# Patient Record
Sex: MALE | Race: WHITE | NOT HISPANIC OR LATINO | Employment: OTHER | URBAN - METROPOLITAN AREA
[De-identification: names, ages, dates, MRNs, and addresses within clinical notes are randomized per-mention and may not be internally consistent; named-entity substitution may affect disease eponyms.]

---

## 2022-12-28 ENCOUNTER — REFERRAL (OUTPATIENT)
Dept: TRANSPLANT | Facility: CLINIC | Age: 62
End: 2022-12-28

## 2022-12-28 DIAGNOSIS — J44.9 COPD (CHRONIC OBSTRUCTIVE PULMONARY DISEASE) (H): Primary | ICD-10-CM

## 2022-12-28 NOTE — LETTER
Issa Lyle  [address] [place] [id number]                January 23, 2023      IMAGING REQUEST  Lung Transplant Team Request for Records from Referring Providers Office for Patients Referred to [facility] Lung Transplant Program                  Facility: [facility]      Fax# [phone]    Images Needed to Process Intake of Patient:     Chest xray - most recent)    CT  Chest - all within the last 24 months    CT Chest/Abdomen/Pelvis - most recent    US Abdomen- most recent    Echo - most recent    Coronary Angiogram - most recent   Requested imaging may be electronically sent to the [employer] imaging system via [employer] DICOM connection. When unable to send imaging electronically, an exported DICOM CD may be sent. Please indicate when images have been sent electronically on a return faxed cover sheet.    If  the requested records are not at your facility, please indicate this information on a return faxed cover sheet.      Please send all scans/slides to:   [facility]  [facility] [address]      Please fax requested paper records or fax cover letters to [phone] within 1-3 business days.                  For questions or concerns, please call our office at [phone].

## 2022-12-28 NOTE — LETTER
January 17, 2023    Issa Lyle  [address] [place] [id number]    Dear Issa,    Thank you for your interest in the Transplant Center at [facility]. We look forward to being a part of your care team and assisting you through the transplant process.    As we discussed, your transplant coordinator is Marla Chavez (Lung).  You may call your coordinator at any time with questions or concerns.  Your first scheduled call will be on January 19, 2023 between 10:00 am and 12:00 pm.  If this needs to change, call [phone].    Please complete the following.    1. Fill out and return the enclosed forms    Authorization for Electronic Communication    Authorization to Discuss Protected Health Information    Authorization for Release of Protected Health Information    2. Sign up for:    [employer], access to your electronic medical record (see enclosed pamphlet)    [employer], a transplant education website    You can use these tools to learn more about your transplant, communicate with your care team, and track your medical details    Sincerely,    Solid Organ Transplant  [facility]    cc: Referring Physician PCP

## 2022-12-29 ENCOUNTER — TRANSCRIBE ORDERS (OUTPATIENT)
Dept: OTHER | Age: 62
End: 2022-12-29

## 2022-12-29 DIAGNOSIS — J44.9 CHRONIC OBSTRUCTIVE PULMONARY DISEASE, UNSPECIFIED COPD TYPE (H): Primary | ICD-10-CM

## 2023-01-17 VITALS — BODY MASS INDEX: 24.11 KG/M2 | WEIGHT: 178 LBS | HEIGHT: 72 IN

## 2023-01-17 NOTE — TELEPHONE ENCOUNTER
SOT LUNG INTAKE STEPHENIE      Referring Provider: Dr. Cutler  Primary Provider: Meryl Sampson  Specialist: none  Diagnosis:COPD, lung ca-rt upper lobe  Source/Facility: [facility]    Smoking/nicotine use history: quit 04/2022  Alcohol use history: none  Drug use history: none  Cancer history:  lung ca-rt upper lobe  Cardiac history:  none  BMI:24.1    Notes: Due to COVID, verbal consent received for Care Everywhere Authorization from the patient during this call.    Is patient in a group home/assisted living? no  Does patient have a guardian? no    Referral intake process completed.  Patient is aware that after financial approval is received, medical records will be requested.   Patient confirmed for a callback from transplant coordinator on January 19, 2023. (within 2 weeks)  Tentative evaluation date TBD. (within 4 weeks)    Confirmed coordinator will discuss evaluation process in more detail at the time of their call.   Patient is aware of the need to arrange age appropriate cancer screening, vaccinations, and dental care.  Reminded patient to complete questionnaire, complete medical records release, and review packet prior to evaluation visit .  Assessed patient for special needs (ie--wheelchair, assistance, guardian, and ):  none   Patient instructed to call [phone] with questions.

## 2023-01-19 NOTE — TELEPHONE ENCOUNTER
Contacted patient to complete transplant coordinator intake and discuss lung transplant process. Patient was unavailable at time of call. Left voicemail with call back information for SOT Office [phone]. Awaiting call back.

## 2023-01-26 NOTE — TELEPHONE ENCOUNTER
Contacted patient to follow up on lung transplant referral. Patient reported he was unable to talk requested call back on Monday 1/30/2023.

## 2023-05-08 ENCOUNTER — TELEPHONE (OUTPATIENT)
Dept: TRANSPLANT | Facility: CLINIC | Age: 63
End: 2023-05-08
Payer: COMMERCIAL

## 2023-05-08 NOTE — LETTER
May 8, 2023    Issa Lyle  [address] [place] [address] [address] [place] [id number]      Dear Mr. Lyle,   The purpose of this letter is to verify that as we discussed by phone, you have thought about lung transplant and are not interested in moving forward with the lung transplant process, at this time, due to concerns with upcoming changes in your insurance. As discussed, when you have resolved your insurance concerns please reach out to the lung transplant program to request your referral be re-open.    When you are ready to move forward with the lung transplant process, you may schedule a visit with a transplant pulmonologist by calling [phone] and asking to speak to your transplant coordinator.  We recommend that you continue to follow up with your referring doctor and primary care doctor in order to manage your health concerns.  We want you to know that our program has physician and surgeon coverage 24 hours a day, 365 days a year. In addition, our transplant surgeons and physicians will not be on call for two or more transplant programs more than 30 miles apart unless the circumstances have been reviewed and approved by the [employer] for Organ Sharing (UNOS) Membership and Professional Standards Committee (MPSC). Finally, our primary physician and primary surgeons are not designated as the primary surgeon or primary physician at more than 1 transplant hospital. If this coverage changes or there are substantial program changes, you will be notified in writing by letter.   Attached is a letter from UNOS that describes the services and information offered to patients by UNOS and the Organ Procurement and Transplantation Network (OPTN).  Thank you for allowing us to participate in your care.  We wish you well.  Sincerely,    Solid Organ Transplant  [facility]    Enclosures: UNOS Letter      The Organ Procurement and Transplantation  Network  Toll-free patient services line:     Your resource for organ transplant information    If you have a question regarding your own medical care, you always should call your transplant hospital first. However, for general organ transplant-related information, you can call the Organ Procurement and Transplantation Network (OPTN) toll-free patient services line at [phone]- [phone]. Anyone, including potential transplant candidates, candidates, recipients, family members, friends, living donors, and donor family members, can call this number to:      Talk about organ donation, living donation, the transplant process, the donation process, and transplant policies.  Get a free patient information kit with helpful booklets, waiting list and transplant information, and a list of all transplant hospitals.  Ask questions about the OPTN website (https://optn.transplant.[facility].gov/), the United Network for Organ Sharing s (UNOS) website (https://unos.org/), or the UNOS website for living donors and transplant recipients. (https://www.transplantliving.org/).  Learn how the OPTN can help you.  Talk about any concerns that you may have with a transplant hospital.    The [facility] transplant system, the OPTN, is managed under federal contract by the United Network for Organ Sharing (UNOS), which is a non-profit charitable organization. The OPTN helps create and define organ sharing policies that make the best use of donated organs. This process continuously evaluating new advances and discoveries so policies can be adapted to best serve patients waiting for transplants. To do so, the OPTN works closely with transplant professionals, transplant patients, transplant candidates, donor families, living donors, and the public. All transplant programs and organ procurement organizations throughout the country are OPTN members and are obligated to follow the policies the OPTN creates for allocating organs.    The OPTN also is  responsible for:    Providing educational material for patients, the public, and professionals.  Raising awareness of the need for donated organs and tissue.  Coordinating organ procurement, matching, and placement.  Collecting information about every organ transplant and donation that occurs in the [place].    Remember, you should contact your transplant hospital directly if you have questions or concerns about your own medical care including medical records, work-up progress, and test results.    We are not your transplant hospital, and our staff will not be able to answer questions about your case, so please keep your transplant hospital s phone number handy.    However, while you research your transplant needs and learn as much as you can about transplantation and donation, we welcome your call to our toll-free patient services line at [phone].          Updated 4/1/2019

## 2023-05-08 NOTE — TELEPHONE ENCOUNTER
LUNG TRANSPLANT REFERRAL STATUS UPDATE  Transplant Pulmonologist: None   Transplant Coordinator: Marla Chavez   Referred: 1/23/2023  Current Phase: Referral   Current transplant status:  Active  Pending issues: Insurance coverage  Next steps:     Contacted patient to follow up on lung transplant referral. Patient has not completed RN intake and NPT due to patient choice and concern for appropriate insurance coverage.     Patient noted he will be losing his [place] Medicaid due to recently being granted social security benefits and reported concerns with gap in insurance until he is eligible for SSDI. Patient declining to move forward at this time until his insurance concerns are resolved and stable.     Updated PFR and requested a follow up call to patient to answer and questions or concerns regarding insurance and lung transplant.     Referral closed and letter sent to patient and referring provider.     Patient provided with lung transplant coordinator contact information, encouraged to call in future when he was ready to move forward.